# Patient Record
Sex: FEMALE | Race: BLACK OR AFRICAN AMERICAN | NOT HISPANIC OR LATINO | Employment: FULL TIME | URBAN - METROPOLITAN AREA
[De-identification: names, ages, dates, MRNs, and addresses within clinical notes are randomized per-mention and may not be internally consistent; named-entity substitution may affect disease eponyms.]

---

## 2019-01-08 ENCOUNTER — OFFICE VISIT (OUTPATIENT)
Dept: INTERNAL MEDICINE | Facility: CLINIC | Age: 39
End: 2019-01-08
Payer: COMMERCIAL

## 2019-01-08 VITALS
HEART RATE: 82 BPM | WEIGHT: 209.19 LBS | RESPIRATION RATE: 18 BRPM | SYSTOLIC BLOOD PRESSURE: 142 MMHG | HEIGHT: 68 IN | DIASTOLIC BLOOD PRESSURE: 94 MMHG | OXYGEN SATURATION: 97 % | BODY MASS INDEX: 31.71 KG/M2 | TEMPERATURE: 98 F

## 2019-01-08 DIAGNOSIS — Z76.89 ENCOUNTER TO ESTABLISH CARE: Primary | ICD-10-CM

## 2019-01-08 DIAGNOSIS — F17.200 CURRENT SMOKER: ICD-10-CM

## 2019-01-08 DIAGNOSIS — E66.9 OBESITY (BMI 30.0-34.9): ICD-10-CM

## 2019-01-08 DIAGNOSIS — I10 ESSENTIAL HYPERTENSION: ICD-10-CM

## 2019-01-08 PROCEDURE — 3077F SYST BP >= 140 MM HG: CPT | Mod: CPTII,S$GLB,, | Performed by: INTERNAL MEDICINE

## 2019-01-08 PROCEDURE — 99204 PR OFFICE/OUTPT VISIT, NEW, LEVL IV, 45-59 MIN: ICD-10-PCS | Mod: S$GLB,,, | Performed by: INTERNAL MEDICINE

## 2019-01-08 PROCEDURE — 3077F PR MOST RECENT SYSTOLIC BLOOD PRESSURE >= 140 MM HG: ICD-10-PCS | Mod: CPTII,S$GLB,, | Performed by: INTERNAL MEDICINE

## 2019-01-08 PROCEDURE — 3008F PR BODY MASS INDEX (BMI) DOCUMENTED: ICD-10-PCS | Mod: CPTII,S$GLB,, | Performed by: INTERNAL MEDICINE

## 2019-01-08 PROCEDURE — 3080F PR MOST RECENT DIASTOLIC BLOOD PRESSURE >= 90 MM HG: ICD-10-PCS | Mod: CPTII,S$GLB,, | Performed by: INTERNAL MEDICINE

## 2019-01-08 PROCEDURE — 99999 PR PBB SHADOW E&M-NEW PATIENT-LVL III: CPT | Mod: PBBFAC,,, | Performed by: INTERNAL MEDICINE

## 2019-01-08 PROCEDURE — 99999 PR PBB SHADOW E&M-NEW PATIENT-LVL III: ICD-10-PCS | Mod: PBBFAC,,, | Performed by: INTERNAL MEDICINE

## 2019-01-08 PROCEDURE — 3008F BODY MASS INDEX DOCD: CPT | Mod: CPTII,S$GLB,, | Performed by: INTERNAL MEDICINE

## 2019-01-08 PROCEDURE — 99204 OFFICE O/P NEW MOD 45 MIN: CPT | Mod: S$GLB,,, | Performed by: INTERNAL MEDICINE

## 2019-01-08 PROCEDURE — 3080F DIAST BP >= 90 MM HG: CPT | Mod: CPTII,S$GLB,, | Performed by: INTERNAL MEDICINE

## 2019-01-08 RX ORDER — CHLORTHALIDONE 25 MG/1
25 TABLET ORAL DAILY
Qty: 30 TABLET | Refills: 11 | Status: SHIPPED | OUTPATIENT
Start: 2019-01-08 | End: 2020-04-13

## 2019-01-08 RX ORDER — AMLODIPINE BESYLATE 10 MG/1
10 TABLET ORAL DAILY
COMMUNITY
End: 2019-01-08 | Stop reason: SDUPTHER

## 2019-01-08 RX ORDER — BUPROPION HYDROCHLORIDE 150 MG/1
150 TABLET, EXTENDED RELEASE ORAL 2 TIMES DAILY
Qty: 60 TABLET | Refills: 2 | Status: SHIPPED | OUTPATIENT
Start: 2019-01-08 | End: 2019-04-08

## 2019-01-08 RX ORDER — AMLODIPINE BESYLATE 10 MG/1
10 TABLET ORAL DAILY
Qty: 90 TABLET | Refills: 3 | Status: SHIPPED | OUTPATIENT
Start: 2019-01-08 | End: 2020-04-13

## 2019-01-08 NOTE — PROGRESS NOTES
Subjective:      Patient ID: Jena Mancera is a 38 y.o. female.    Chief Complaint: Follow-up      Ms. Jena Mancera is a patient of Primary Doctor Mary, who presents to establish primary care.    HPI   She reports taking her amlodipine 10 mg as prescribed.      Past Medical History:   Diagnosis Date    Hypertension     Obesity (BMI 30.0-34.9)      Past Surgical History:   Procedure Laterality Date     SECTION       Social History     Socioeconomic History    Marital status:      Spouse name: Not on file    Number of children: Not on file    Years of education: Not on file    Highest education level: Not on file   Social Needs    Financial resource strain: Not on file    Food insecurity - worry: Not on file    Food insecurity - inability: Not on file    Transportation needs - medical: Not on file    Transportation needs - non-medical: Not on file   Occupational History    Not on file   Tobacco Use    Smoking status: Heavy Tobacco Smoker     Packs/day: 0.50     Years: 6.00     Pack years: 3.00    Smokeless tobacco: Never Used    Tobacco comment: Tried Chantix in the past, which didn't help   Substance and Sexual Activity    Alcohol use: Yes    Drug use: No    Sexual activity: Yes     Partners: Male     Birth control/protection: None   Other Topics Concern    Not on file   Social History Narrative    Patient goal(s): Quit smoking, lose wt down to 160 lbs        Motivation for goals (0-10): 9        Breakfast:     Lunch:     Dinner:     Snacks:    Eating out:     Water (oz/day):    Physical Activity (min/d & d/wk):      Goals     None        Family History   Problem Relation Age of Onset    Cerebral aneurysm Father        Current Outpatient Medications:     amLODIPine (NORVASC) 10 MG tablet, Take 1 tablet (10 mg total) by mouth once daily., Disp: 90 tablet, Rfl: 3    buPROPion (WELLBUTRIN SR) 150 MG TBSR 12 hr tablet, Take 1 tablet (150 mg total) by mouth 2 (two) times  "daily., Disp: 60 tablet, Rfl: 2    chlorthalidone (HYGROTEN) 25 MG Tab, Take 1 tablet (25 mg total) by mouth once daily., Disp: 30 tablet, Rfl: 11    Review of patient's allergies indicates:  No Known Allergies    Review of Systems   All remaining systems negative    Objective:     BP (!) 142/94 (BP Location: Left arm, Patient Position: Sitting, BP Method: Large (Manual))   Pulse 82   Temp 97.8 °F (36.6 °C) (Tympanic)   Resp 18   Ht 5' 8" (1.727 m)   Wt 94.9 kg (209 lb 3.5 oz)   LMP 12/23/2018 (Exact Date)   SpO2 97%   BMI 31.81 kg/m²     Physical Exam  GEN: A&O fully, NAD  PSYC: Normal affect      Assessment:      1. Encounter to establish care    2. Current smoker: Risks and benefits discussed and patient chose to move forward with wellbutrin  mg 1 po x3d, then BID for remainder of 90 days and quit smoking on day 7.    3. Essential hypertension: Not at goal. Risks and benefits discussed and patient chose to move forward with chlorthalidone 25 mg 1 po qd.   4. Obesity (BMI 30.0-34.9): Discussed strategies for lifestyle modifications, particularly systematically increasing physical activity (5-90 min/episode, 3-5 times/week), water (1/2 of body weight in ounces) and avoiding potatoes, sugar sweetened beverages, red/processed meats (including chicken), fast food, grains (rice/bread/pasta); and increasing yogurt, whole fruits, unsalted nuts to 3-5 servings/day, and daily weight logging; non-starchy vegetables, cooked beans, and un-fried seafood have weak effects on weight.       Plan:   Encounter to establish care  -     CBC auto differential; Future; Expected date: 01/08/2019  -     Comprehensive metabolic panel; Future; Expected date: 01/08/2019  -     Lipid panel; Future; Expected date: 01/08/2019  -     Vitamin D; Future; Expected date: 01/08/2019    Current smoker  -     buPROPion (WELLBUTRIN SR) 150 MG TBSR 12 hr tablet; Take 1 tablet (150 mg total) by mouth 2 (two) times daily.  Dispense: 60 " tablet; Refill: 2    Essential hypertension  -     chlorthalidone (HYGROTEN) 25 MG Tab; Take 1 tablet (25 mg total) by mouth once daily.  Dispense: 30 tablet; Refill: 11  -     amLODIPine (NORVASC) 10 MG tablet; Take 1 tablet (10 mg total) by mouth once daily.  Dispense: 90 tablet; Refill: 3    Obesity (BMI 30.0-34.9)        Follow-up in about 3 months (around 4/8/2019), or if symptoms worsen or fail to improve.      I spent 45 minutes of time with patient 50% or more of which was discussing labs and plans of care.

## 2019-01-11 ENCOUNTER — LAB VISIT (OUTPATIENT)
Dept: LAB | Facility: HOSPITAL | Age: 39
End: 2019-01-11
Attending: INTERNAL MEDICINE
Payer: COMMERCIAL

## 2019-01-11 DIAGNOSIS — Z76.89 ENCOUNTER TO ESTABLISH CARE: ICD-10-CM

## 2019-01-11 LAB
25(OH)D3+25(OH)D2 SERPL-MCNC: 6 NG/ML
ALBUMIN SERPL BCP-MCNC: 3.9 G/DL
ALP SERPL-CCNC: 49 U/L
ALT SERPL W/O P-5'-P-CCNC: 16 U/L
ANION GAP SERPL CALC-SCNC: 9 MMOL/L
AST SERPL-CCNC: 17 U/L
BASOPHILS # BLD AUTO: 0.02 K/UL
BASOPHILS NFR BLD: 0.4 %
BILIRUB SERPL-MCNC: 0.6 MG/DL
BUN SERPL-MCNC: 8 MG/DL
CALCIUM SERPL-MCNC: 9.5 MG/DL
CHLORIDE SERPL-SCNC: 104 MMOL/L
CHOLEST SERPL-MCNC: 155 MG/DL
CHOLEST/HDLC SERPL: 4.3 {RATIO}
CO2 SERPL-SCNC: 26 MMOL/L
CREAT SERPL-MCNC: 0.7 MG/DL
DIFFERENTIAL METHOD: ABNORMAL
EOSINOPHIL # BLD AUTO: 0.1 K/UL
EOSINOPHIL NFR BLD: 2.4 %
ERYTHROCYTE [DISTWIDTH] IN BLOOD BY AUTOMATED COUNT: 13.4 %
EST. GFR  (AFRICAN AMERICAN): >60 ML/MIN/1.73 M^2
EST. GFR  (NON AFRICAN AMERICAN): >60 ML/MIN/1.73 M^2
GLUCOSE SERPL-MCNC: 66 MG/DL
HCT VFR BLD AUTO: 40.1 %
HDLC SERPL-MCNC: 36 MG/DL
HDLC SERPL: 23.2 %
HGB BLD-MCNC: 12.8 G/DL
IMM GRANULOCYTES # BLD AUTO: 0.01 K/UL
IMM GRANULOCYTES NFR BLD AUTO: 0.2 %
LDLC SERPL CALC-MCNC: 107 MG/DL
LYMPHOCYTES # BLD AUTO: 2 K/UL
LYMPHOCYTES NFR BLD: 38.9 %
MCH RBC QN AUTO: 29.4 PG
MCHC RBC AUTO-ENTMCNC: 31.9 G/DL
MCV RBC AUTO: 92 FL
MONOCYTES # BLD AUTO: 0.4 K/UL
MONOCYTES NFR BLD: 7.9 %
NEUTROPHILS # BLD AUTO: 2.5 K/UL
NEUTROPHILS NFR BLD: 50.2 %
NONHDLC SERPL-MCNC: 119 MG/DL
NRBC BLD-RTO: 0 /100 WBC
PLATELET # BLD AUTO: 342 K/UL
PMV BLD AUTO: 9.3 FL
POTASSIUM SERPL-SCNC: 3.8 MMOL/L
PROT SERPL-MCNC: 8 G/DL
RBC # BLD AUTO: 4.35 M/UL
SODIUM SERPL-SCNC: 139 MMOL/L
TRIGL SERPL-MCNC: 60 MG/DL
WBC # BLD AUTO: 5.04 K/UL

## 2019-01-11 PROCEDURE — 85025 COMPLETE CBC W/AUTO DIFF WBC: CPT

## 2019-01-11 PROCEDURE — 36415 COLL VENOUS BLD VENIPUNCTURE: CPT | Mod: PO

## 2019-01-11 PROCEDURE — 82306 VITAMIN D 25 HYDROXY: CPT

## 2019-01-11 PROCEDURE — 80053 COMPREHEN METABOLIC PANEL: CPT

## 2019-01-11 PROCEDURE — 80061 LIPID PANEL: CPT

## 2019-01-16 ENCOUNTER — TELEPHONE (OUTPATIENT)
Dept: INTERNAL MEDICINE | Facility: CLINIC | Age: 39
End: 2019-01-16

## 2019-01-16 DIAGNOSIS — R11.0 NAUSEA: Primary | ICD-10-CM

## 2019-01-16 RX ORDER — ONDANSETRON 4 MG/1
4 TABLET, FILM COATED ORAL EVERY 8 HOURS PRN
Qty: 30 TABLET | Refills: 0 | Status: SHIPPED | OUTPATIENT
Start: 2019-01-16

## 2019-01-16 NOTE — TELEPHONE ENCOUNTER
----- Message from Meghann Hernandez sent at 1/16/2019 11:48 AM CST -----  Contact: pt   Call pt regarding a med that she was put on that makes her feel bad and need to know if she need to keep taking it or what.    [phone] ([place])

## 2019-01-16 NOTE — TELEPHONE ENCOUNTER
Spoke with pt she stated Bupropion is making her feel  light headed and nauseous and she wants to know if she should continue taking it.

## 2019-03-04 ENCOUNTER — PATIENT OUTREACH (OUTPATIENT)
Dept: ADMINISTRATIVE | Facility: HOSPITAL | Age: 39
End: 2019-03-04

## 2019-03-04 NOTE — LETTER
March 4, 2019        Jena DEMETRIUS Fiordaliza  [address] [place] [address]      Dear Mrs. Mancera,    You have an upcoming appointment with Jere Grande MD on 03/18/19.      Your chart is indicating you may be due for the following and I will be happy to assist you in scheduling any needed appointments:  Health Maintenance Due   Topic    Pneumococcal Vaccine (Medium Risk) (1 of 1 - PPSV23)    Pap Smear with HPV Cotest     TETANUS VACCINE     Influenza Vaccine           If you have had any of the above done at another facility, please bring the records or information with you so that your record at [facility] will be complete.    We will be happy to assist you with scheduling any necessary appointments or you may contact the [facility] appointment desk at [phone] to schedule at your convenience.     Thank you for choosing [facility] for your healthcare needs,      Lucia KIRAN LPN Care Coordinator  [facility]  [phone]

## 2020-02-21 DIAGNOSIS — Z12.39 BREAST CANCER SCREENING: ICD-10-CM

## 2020-03-20 ENCOUNTER — TELEPHONE (OUTPATIENT)
Dept: FAMILY MEDICINE | Facility: CLINIC | Age: 40
End: 2020-03-20

## 2020-03-20 ENCOUNTER — TELEPHONE (OUTPATIENT)
Dept: INTERNAL MEDICINE | Facility: CLINIC | Age: 40
End: 2020-03-20

## 2020-03-20 NOTE — TELEPHONE ENCOUNTER
Return patient call in reference to her video balance.patient said she cannot set up her appt due to outstanding balance. She will call back later.

## 2020-03-20 NOTE — TELEPHONE ENCOUNTER
----- Message from Ciara Menendez sent at 3/20/2020  1:41 PM CDT -----  Contact: pt  Pt is returning your call after getting computer set up for video appt and can be reached at [phone]      Thanks,  Ciara Menendez

## 2020-03-20 NOTE — TELEPHONE ENCOUNTER
----- Message from Suhail Spann sent at 3/20/2020  7:41 AM CDT -----  Contact: pt   Type:  Needs Medical Advice    Who Called:KISHAN ALVARES  Symptoms (please be specific):  How long has patient had these symptoms:    Pharmacy name and phone #:   Would the patient rather a call back or a response via [facility]? call  Best Call Back Number: [phone] (home)   Additional Information:   Pt is requesting a call back from the nurse in regards to the pt needing to be set up for a video visit for her BP meds to be refilled please

## 2020-03-20 NOTE — TELEPHONE ENCOUNTER
Spoke with pt, let her know she needs to sign up for [employer] so we can do a video visit. Sent her the link via text to sign up and told her to call us back once completed. Pt verbalized understansing.

## 2020-04-10 DIAGNOSIS — I10 ESSENTIAL HYPERTENSION: ICD-10-CM

## 2020-04-10 DIAGNOSIS — Z11.4 ENCOUNTER FOR SCREENING FOR HIV: Primary | ICD-10-CM

## 2020-04-13 RX ORDER — CHLORTHALIDONE 25 MG/1
TABLET ORAL
Qty: 90 TABLET | Refills: 4 | Status: SHIPPED | OUTPATIENT
Start: 2020-04-13

## 2020-04-13 RX ORDER — AMLODIPINE BESYLATE 10 MG/1
TABLET ORAL
Qty: 90 TABLET | Refills: 4 | Status: SHIPPED | OUTPATIENT
Start: 2020-04-13

## 2020-04-14 NOTE — TELEPHONE ENCOUNTER
Need pap and mammo records or schedule for this.     Due for labs and follow up a couple days after

## 2021-04-28 ENCOUNTER — PATIENT MESSAGE (OUTPATIENT)
Dept: RESEARCH | Facility: HOSPITAL | Age: 41
End: 2021-04-28